# Patient Record
Sex: MALE | Race: BLACK OR AFRICAN AMERICAN | NOT HISPANIC OR LATINO | URBAN - METROPOLITAN AREA
[De-identification: names, ages, dates, MRNs, and addresses within clinical notes are randomized per-mention and may not be internally consistent; named-entity substitution may affect disease eponyms.]

---

## 2019-10-08 ENCOUNTER — HOSPITAL ENCOUNTER (EMERGENCY)
Facility: HOSPITAL | Age: 34
Discharge: HOME | End: 2019-10-08
Attending: EMERGENCY MEDICINE
Payer: COMMERCIAL

## 2019-10-08 VITALS
OXYGEN SATURATION: 100 % | DIASTOLIC BLOOD PRESSURE: 63 MMHG | SYSTOLIC BLOOD PRESSURE: 133 MMHG | HEART RATE: 58 BPM | TEMPERATURE: 98 F | RESPIRATION RATE: 16 BRPM

## 2019-10-08 DIAGNOSIS — S61.209A FLEXOR TENDON LACERATION OF FINGER WITH OPEN WOUND, INITIAL ENCOUNTER: Primary | ICD-10-CM

## 2019-10-08 DIAGNOSIS — S56.129A FLEXOR TENDON LACERATION OF FINGER WITH OPEN WOUND, INITIAL ENCOUNTER: Primary | ICD-10-CM

## 2019-10-08 PROCEDURE — 63600175 PHARM REV CODE 636 W HCPCS: Performed by: PHYSICIAN ASSISTANT

## 2019-10-08 PROCEDURE — 90715 TDAP VACCINE 7 YRS/> IM: CPT | Performed by: PHYSICIAN ASSISTANT

## 2019-10-08 PROCEDURE — 25000003 PHARM REV CODE 250: Performed by: STUDENT IN AN ORGANIZED HEALTH CARE EDUCATION/TRAINING PROGRAM

## 2019-10-08 PROCEDURE — 99284 EMERGENCY DEPT VISIT MOD MDM: CPT | Mod: 25

## 2019-10-08 PROCEDURE — 99284 PR EMERGENCY DEPT VISIT,LEVEL IV: ICD-10-PCS | Mod: ,,, | Performed by: PHYSICIAN ASSISTANT

## 2019-10-08 PROCEDURE — 90471 IMMUNIZATION ADMIN: CPT | Performed by: PHYSICIAN ASSISTANT

## 2019-10-08 PROCEDURE — 25000003 PHARM REV CODE 250: Performed by: PHYSICIAN ASSISTANT

## 2019-10-08 PROCEDURE — 99284 EMERGENCY DEPT VISIT MOD MDM: CPT | Mod: ,,, | Performed by: PHYSICIAN ASSISTANT

## 2019-10-08 RX ORDER — LIDOCAINE HYDROCHLORIDE 10 MG/ML
10 INJECTION, SOLUTION EPIDURAL; INFILTRATION; INTRACAUDAL; PERINEURAL
Status: COMPLETED | OUTPATIENT
Start: 2019-10-08 | End: 2019-10-08

## 2019-10-08 RX ORDER — HYDROCODONE BITARTRATE AND ACETAMINOPHEN 5; 325 MG/1; MG/1
1 TABLET ORAL
Status: COMPLETED | OUTPATIENT
Start: 2019-10-08 | End: 2019-10-08

## 2019-10-08 RX ORDER — SULFAMETHOXAZOLE AND TRIMETHOPRIM 800; 160 MG/1; MG/1
1 TABLET ORAL
Status: COMPLETED | OUTPATIENT
Start: 2019-10-08 | End: 2019-10-08

## 2019-10-08 RX ORDER — SULFAMETHOXAZOLE AND TRIMETHOPRIM 800; 160 MG/1; MG/1
1 TABLET ORAL 2 TIMES DAILY
Qty: 20 TABLET | Refills: 0 | Status: SHIPPED | OUTPATIENT
Start: 2019-10-08 | End: 2019-10-18

## 2019-10-08 RX ADMIN — LIDOCAINE HYDROCHLORIDE 100 MG: 10 INJECTION, SOLUTION EPIDURAL; INFILTRATION; INTRACAUDAL; PERINEURAL at 01:10

## 2019-10-08 RX ADMIN — CLOSTRIDIUM TETANI TOXOID ANTIGEN (FORMALDEHYDE INACTIVATED), CORYNEBACTERIUM DIPHTHERIAE TOXOID ANTIGEN (FORMALDEHYDE INACTIVATED), BORDETELLA PERTUSSIS TOXOID ANTIGEN (GLUTARALDEHYDE INACTIVATED), BORDETELLA PERTUSSIS FILAMENTOUS HEMAGGLUTININ ANTIGEN (FORMALDEHYDE INACTIVATED), BORDETELLA PERTUSSIS PERTACTIN ANTIGEN, AND BORDETELLA PERTUSSIS FIMBRIAE 2/3 ANTIGEN 0.5 ML: 5; 2; 2.5; 5; 3; 5 INJECTION, SUSPENSION INTRAMUSCULAR at 12:10

## 2019-10-08 RX ADMIN — HYDROCODONE BITARTRATE AND ACETAMINOPHEN 1 TABLET: 5; 325 TABLET ORAL at 12:10

## 2019-10-08 RX ADMIN — SULFAMETHOXAZOLE AND TRIMETHOPRIM 1 TABLET: 800; 160 TABLET ORAL at 03:10

## 2019-10-08 NOTE — ED PROVIDER NOTES
Encounter Date: 10/8/2019       History     Chief Complaint   Patient presents with    Finger Laceration     no bone exposed, bleeding controlled.     34-year-old male presents to the ED for evaluation of finger laceration.  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  He denies any numbness or tingling.  He is not able to fully flex the fingers.  Per chart, last tetanus was about 5 years ago.        Review of patient's allergies indicates:  No Known Allergies  History reviewed. No pertinent past medical history.  History reviewed. No pertinent surgical history.  History reviewed. No pertinent family history.  Social History     Tobacco Use    Smoking status: Never Smoker    Smokeless tobacco: Never Used   Substance Use Topics    Alcohol use: Not Currently     Frequency: Never    Drug use: Not Currently     Review of Systems   Constitutional: Negative for fever.   HENT: Negative for sore throat.    Respiratory: Negative for shortness of breath.    Cardiovascular: Negative for chest pain.   Gastrointestinal: Negative for nausea.   Genitourinary: Negative for dysuria.   Musculoskeletal: Negative for back pain.   Skin: Positive for wound. Negative for rash.   Neurological: Negative for weakness and numbness.   Hematological: Does not bruise/bleed easily.       Physical Exam     Initial Vitals [10/08/19 1215]   BP Pulse Resp Temp SpO2   (!) 140/86 70 18 98.2 °F (36.8 °C) 100 %      MAP       --         Physical Exam    Nursing note and vitals reviewed.  Constitutional: He appears well-developed and well-nourished.  Non-toxic appearance. He does not appear ill. No distress.   HENT:   Head: Normocephalic and atraumatic.   Neck: Normal range of motion. Neck supple.   Cardiovascular: Normal rate and regular rhythm. Exam reveals no gallop, no distant heart sounds and no friction rub.    No murmur  heard.  Pulmonary/Chest: Effort normal and breath sounds normal. No accessory muscle usage. No tachypnea. No respiratory distress. He has no decreased breath sounds. He has no wheezes. He has no rhonchi. He has no rales.   Abdominal: He exhibits no distension.   Musculoskeletal:   Lacerations to the 2nd and 3rd digits on the palmar aspect of the right hand.  Patient unable to flex at the DIP and PIP of the 2nd digit.  Limited flexion at the DIP and PIP of 3rd digit.  Normal sensation to light touch.  Brisk capillary refill.  Radial pulse intact. No significant swelling to the digits or hand.    Neurological: He is alert.   Skin: No rash noted.         ED Course   Procedures  Labs Reviewed - No data to display       Imaging Results          X-Ray Hand 3 view Right (Final result)  Result time 10/08/19 14:16:13    Final result by Yannick Bennett MD (10/08/19 14:16:13)                 Impression:      Soft tissue wound likely lacerations of the volar aspect of the right 2nd and 3rd digits.  No fracture or foreign body seen.      Electronically signed by: Yannick Bennett  Date:    10/08/2019  Time:    14:16             Narrative:    EXAMINATION:  XR HAND COMPLETE 3 VIEW RIGHT    CLINICAL HISTORY:  hand injury with lacerations;    TECHNIQUE:  PA, lateral, and oblique views of the right hand were performed.    COMPARISON:  None    FINDINGS:  Frontal, oblique and lateral views presented.  There is soft tissue irregularity along the volar aspect of the right 2nd digit consistent with laceration.  No foreign body seen.  No fracture or erosion or bone defect.  The mineralization and joint space and alignment are normal.  There is also soft tissue irregularity along the volar aspect of the 3rd digit adjacent to the middle phalanx.  No fracture or erosion or periosteal reaction or bone defect.                                 Medical Decision Making:   History:   Old Medical Records: I decided to obtain old medical  records.  Differential Diagnosis:   My differential diagnosis includes but is not limited to:  Laceration, open fracture, tendon laceration, nerve injury, vascular injury  Clinical Tests:   Radiological Study: Ordered  ED Management:  34-year-old male presents for evaluation lacerations to the right hand.  Patient's tetanus vaccine was updated in the ED as it was nearly 5 years old.  Based on patient's exam, I have concern for flexor tendon laceration on digits 2 and 3 of the right hand.  Orthopedics was consulted.  They evaluated the patient in the ED.  A splint was placed in the ED.  They have arranged close follow-up and surgical repair for this week.  Patient will be discharged with a 10 day course of Bactrim.  He was given the 1st dose in the ED.  Will prescribe a small amount of pain medication.  ED return precautions given.  Stable for discharge.  I have reviewed the patient's records and discussed this case with my supervising physician.    Other:   I have discussed this case with another health care provider.       <> Summary of the Discussion: Orthopedic surgery                      Clinical Impression:       ICD-10-CM ICD-9-CM   1. Flexor tendon laceration of finger with open wound, initial encounter S56.129A 883.2    S61.209A          Disposition:   Disposition: Discharged  Condition: Stable                        Bobbi Wasserman PA-C  10/08/19 2903

## 2019-10-08 NOTE — SUBJECTIVE & OBJECTIVE
History reviewed. No pertinent past medical history.    History reviewed. No pertinent surgical history.    Review of patient's allergies indicates:  No Known Allergies    No current facility-administered medications for this encounter.      Current Outpatient Medications   Medication Sig    sulfamethoxazole-trimethoprim 800-160mg (BACTRIM DS) 800-160 mg Tab Take 1 tablet by mouth 2 (two) times daily. for 10 days     Family History     None        Tobacco Use    Smoking status: Never Smoker    Smokeless tobacco: Never Used   Substance and Sexual Activity    Alcohol use: Not Currently     Frequency: Never    Drug use: Not Currently    Sexual activity: Not on file     Review of Systems   Cardiovascular: Positive for syncope.    per ED  Objective:     Vital Signs (Most Recent):  Temp: 98.2 °F (36.8 °C) (10/08/19 1215)  Pulse: (!) 58 (10/08/19 1506)  Resp: 16 (10/08/19 1506)  BP: 133/63 (10/08/19 1506)  SpO2: 100 % (10/08/19 1506) Vital Signs (24h Range):  Temp:  [98.2 °F (36.8 °C)] 98.2 °F (36.8 °C)  Pulse:  [58-70] 58  Resp:  [16-18] 16  SpO2:  [100 %] 100 %  BP: (133-140)/(63-86) 133/63           There is no height or weight on file to calculate BMI.    No intake or output data in the 24 hours ending 10/08/19 1612    Ortho/SPM Exam  PE:  Gen:  No acute distress  CV:  Peripherally well-perfused.    Lungs:  Normal respiratory effort.  Head/Neck:  Normocephalic.  Atraumatic.     RUE:  4 cm tranverse laceration on volar aspect of R index finger distal to MCP  7 cm oblique laceration on volar aspect of R middle finger from PIP to DIP  No active flexion of index finger at PIP or DIP  No active flexion of middle finger at DIP  Diminished sensation to light touch on index and middle fingers  Bleeding from digital artery of middle finger  No open wounds/abrasions/laceration  No bony TTP  FROM shoulder, elbow and wrist  SILT M/U/R  Motor intact AIN/PIN/M/U/R   Cap refill < 2s  2+ RP      Significant Labs: All pertinent  labs within the past 24 hours have been reviewed.    Significant Imaging: I have reviewed all pertinent imaging results/findings.     Procedure Note: Laceration Repair  After time out was performed and patient ID, side, and site were verified, the right Index finger and Right middle finger was sterilly prepped in the standard fashion.  10 mL's of 1% lidocaine were injected around the site with a 25-gauge needle. After adequate analgesia was obtained, the wound was examined. Examination showed complete laceration of flexor tendons on R index finger. The laceration was then thoroughly irrigated with 2L of normal saline and betadine. At this point, the wound was primarily closed using 4 - 0 Ethilon. The wound was dressed with xeroform, 4x4 and patient was placed in radial gutter splint. The patient tolerated the procedure well with no complications.  Blood loss was minimal.

## 2019-10-08 NOTE — HPI
Delmer Castellano is a 34 y.o. male who presents to ED with laceration of volar aspect of 2nd and 3rd right digits after having his hand get caught between a pole and traffic sign while at work. The patient noticed immediate pain, bleeding, and inability to flex his 2nd digit. The patient states the bleeding stopped after holding pressure for a few minutes, but he also began to notice numbness distal to MCP of 2nd and 3rd fingers while in the ED which was not present prior. The patient denies tingling to his fingers. 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 No prior injuries to his RUE. Patient had last tetanus booster around 5 years ago.

## 2019-10-08 NOTE — DISCHARGE INSTRUCTIONS
You can take Tylenol every 4-6 hours in addition to ibuprofen 400 or 600 mg every 4-6 hours as needed for pain. Keep the hand elevated at or above the level of the heart when possible.   Take care antibiotic medications as prescribed.  You should receive a phone call from Orthopedics in the next day 4 year follow-up information.   Return to the ER immediately for any new or worsening symptoms such as:  Numbness to the fingers, change in the color of the skin of the fingers, worsening pain, fevers greater than 100.4°, or any concerning symptoms.

## 2019-10-08 NOTE — ASSESSMENT & PLAN NOTE
Delmer Castellano is a 34 y.o. male who presents to ED with laceration of Right index and middle fingers. The patient does have some diminished sensation to these digits, and likely complete lacerations to both FDS and FDP of his R index finger and FDP of his R middle finger. Discussed with patient that he will likely need surgery by hand surgeon for repair of these injuries. Recommend patient be discharged on Bactrim x10 days. We will arrange follow-up for this patient with our orthopaedic hand surgeon this week.

## 2019-10-08 NOTE — ED TRIAGE NOTES
Patient arrives via EMS, states his fingers were crushed between a pole on the truck fell over and the traffic sign crushed his fingers. Laceration to right hand second finger second joint and third finger second joint.

## 2019-10-08 NOTE — CONSULTS
[facility]  Orthopedics  Consult Note    Patient Name: Delmer Castellano  MRN: [id number]  Admission Date: 10/8/2019  Hospital Length of Stay: 0 days  Attending Provider: No att. providers found  Primary Care Provider: No primary care provider on file.    Patient information was obtained from patient and ER records.     Inpatient consult to Orthopedic Surgery  Consult performed by: Cody Valles MD  Consult ordered by: Bobbi Wasserman PA-C        Subjective:     Principal Problem:Flexor tendon laceration of finger with open wound    Chief Complaint:   Chief Complaint   Patient presents with    Finger Laceration     no bone exposed, bleeding controlled.        HPI: Delmer Castellano is a 34 y.o. male who presents to ED with laceration of volar aspect of 2nd and 3rd right digits after having his hand get caught between a pole and traffic sign while at work. The patient noticed immediate pain, bleeding, and inability to flex his 2nd digit. The patient states the bleeding stopped after holding pressure for a few minutes, but he also began to notice numbness distal to MCP of 2nd and 3rd fingers while in the ED which was not present prior. The patient denies tingling to his fingers. 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 No prior injuries to his RUE. Patient had last tetanus booster around 5 years ago.      History reviewed. No pertinent past medical history.    History reviewed. No pertinent surgical history.    Review of patient's allergies indicates:  No Known Allergies    No current facility-administered medications for this encounter.      Current Outpatient Medications   Medication Sig    sulfamethoxazole-trimethoprim 800-160mg (BACTRIM DS) 800-160 mg Tab Take 1 tablet by mouth 2 (two) times daily. for 10 days     Family History     None        Tobacco Use    Smoking status: Never Smoker    Smokeless tobacco: Never Used   Substance and Sexual  Activity    Alcohol use: Not Currently     Frequency: Never    Drug use: Not Currently    Sexual activity: Not on file     Review of Systems   Cardiovascular: Positive for syncope.    per ED  Objective:     Vital Signs (Most Recent):  Temp: 98.2 °F (36.8 °C) (10/08/19 1215)  Pulse: (!) 58 (10/08/19 1506)  Resp: 16 (10/08/19 1506)  BP: 133/63 (10/08/19 1506)  SpO2: 100 % (10/08/19 1506) Vital Signs (24h Range):  Temp:  [98.2 °F (36.8 °C)] 98.2 °F (36.8 °C)  Pulse:  [58-70] 58  Resp:  [16-18] 16  SpO2:  [100 %] 100 %  BP: (133-140)/(63-86) 133/63           There is no height or weight on file to calculate BMI.    No intake or output data in the 24 hours ending 10/08/19 1612    Ortho/SPM Exam  PE:  Gen:  No acute distress  CV:  Peripherally well-perfused.    Lungs:  Normal respiratory effort.  Head/Neck:  Normocephalic.  Atraumatic.      RUE:  4 cm tranverse laceration on volar aspect of R index finger distal to MCP  7 cm oblique laceration on volar aspect of R middle finger from PIP to DIP  No active flexion of index finger at PIP or DIP  No active flexion of middle finger at DIP  Diminished sensation to light touch on index and middle fingers  Bleeding from digital artery of middle finger  No open wounds/abrasions/laceration  No bony TTP  FROM shoulder, elbow and wrist  SILT M/U/R  Motor intact AIN/PIN/M/U/R   Cap refill < 2s  2+ RP      Significant Labs: All pertinent labs within the past 24 hours have been reviewed.    Significant Imaging: I have reviewed all pertinent imaging results/findings.     Procedure Note: Laceration Repair  After time out was performed and patient ID, side, and site were verified, the right Index finger and Right middle finger was sterilly prepped in the standard fashion.  10 mL's of 1% lidocaine were injected around the site with a 25-gauge needle. After adequate analgesia was obtained, the wound was examined. Examination showed complete laceration of flexor tendons on R index finger.  The laceration was then thoroughly irrigated with 2L of normal saline and betadine. At this point, the wound was primarily closed using 4 - 0 Ethilon. The wound was dressed with xeroform, 4x4 and patient was placed in radial gutter splint. The patient tolerated the procedure well with no complications.  Blood loss was minimal.      Assessment/Plan:     * Flexor tendon laceration of finger with open wound  Delmer Castellano is a 34 y.o. male who presents to ED with laceration of Right index and middle fingers. The patient does have some diminished sensation to these digits, and likely complete lacerations to both FDS and FDP of his R index finger and FDP of his R middle finger. Discussed with patient that he will likely need surgery by hand surgeon for repair of these injuries. Recommend patient be discharged on Bactrim x10 days. We will arrange follow-up for this patient with our orthopaedic hand surgeon this week.          Thank you for your consult. I will follow-up with patient. Please contact us if you have any additional questions.    Cody Valles MD  Orthopedics  [facility]

## 2019-10-08 NOTE — ED NOTES
Patient identifiers verified and correct for Mr Castellano  C/C: Crush injury to right hand, laceration   APPEARANCE: awake and alert in NAD.  SKIN: warm, dry eliptical laceration 4.5 x 1 cm right index finger, linear laceration 4 cm to right hand 3rd finger, min bleeding, no obvious open bones  MUSCULOSKELETAL: Patient moving all extremities spontaneously, no obvious swelling or deformities noted. Ambulates independently.  RESPIRATORY: Denies shortness of breath.Respirations unlabored.   CARDIAC: Denies CP, 2+ distal pulses; no peripheral edema  ABDOMEN: S/ND/NT, Denies nausea  : voids spontaneously, denies difficulty  Neurologic: AAO x 4; follows commands equal strength in all extremities; denies numbness/tingling. Denies dizziness Denies weakness, positive sensation, mvmt better to 3rd finger, difficulty bending finger, positive radial pulse

## 2019-10-10 ENCOUNTER — OFFICE VISIT (OUTPATIENT)
Dept: ORTHOPEDICS | Facility: CLINIC | Age: 34
End: 2019-10-10
Payer: COMMERCIAL

## 2019-10-10 ENCOUNTER — ANESTHESIA EVENT (OUTPATIENT)
Dept: SURGERY | Facility: HOSPITAL | Age: 34
End: 2019-10-10
Payer: COMMERCIAL

## 2019-10-10 VITALS
BODY MASS INDEX: 20.79 KG/M2 | HEIGHT: 71 IN | SYSTOLIC BLOOD PRESSURE: 108 MMHG | DIASTOLIC BLOOD PRESSURE: 62 MMHG | WEIGHT: 148.5 LBS | HEART RATE: 67 BPM

## 2019-10-10 DIAGNOSIS — S56.129A FLEXOR TENDON LACERATION OF FINGER WITH OPEN WOUND, INITIAL ENCOUNTER: Primary | ICD-10-CM

## 2019-10-10 DIAGNOSIS — S56.129A FLEXOR TENDON LACERATION OF FINGER WITH OPEN WOUND: ICD-10-CM

## 2019-10-10 DIAGNOSIS — S61.209A FLEXOR TENDON LACERATION OF FINGER WITH OPEN WOUND: ICD-10-CM

## 2019-10-10 DIAGNOSIS — S61.209A FLEXOR TENDON LACERATION OF FINGER WITH OPEN WOUND, INITIAL ENCOUNTER: Primary | ICD-10-CM

## 2019-10-10 PROCEDURE — 99204 OFFICE O/P NEW MOD 45 MIN: CPT | Mod: S$GLB,,, | Performed by: ORTHOPAEDIC SURGERY

## 2019-10-10 PROCEDURE — 99213 OFFICE O/P EST LOW 20 MIN: CPT | Mod: PBBFAC | Performed by: ORTHOPAEDIC SURGERY

## 2019-10-10 PROCEDURE — 99999 PR PBB SHADOW E&M-EST. PATIENT-LVL III: ICD-10-PCS | Mod: PBBFAC,,, | Performed by: ORTHOPAEDIC SURGERY

## 2019-10-10 PROCEDURE — 99999 PR PBB SHADOW E&M-EST. PATIENT-LVL III: CPT | Mod: PBBFAC,,, | Performed by: ORTHOPAEDIC SURGERY

## 2019-10-10 PROCEDURE — 99204 PR OFFICE/OUTPT VISIT, NEW, LEVL IV, 45-59 MIN: ICD-10-PCS | Mod: S$GLB,,, | Performed by: ORTHOPAEDIC SURGERY

## 2019-10-10 RX ORDER — MUPIROCIN 20 MG/G
OINTMENT TOPICAL
Status: CANCELLED | OUTPATIENT
Start: 2019-10-10

## 2019-10-10 RX ORDER — SODIUM CHLORIDE 9 MG/ML
INJECTION, SOLUTION INTRAVENOUS CONTINUOUS
Status: CANCELLED | OUTPATIENT
Start: 2019-10-10

## 2019-10-10 NOTE — H&P (VIEW-ONLY)
Attestation signed by Jluis Ugalde MD at 10/10/2019 3:24 PM   I have seen the patient, reviewed the Resident's history and physical. I have personally interviewed and examined the patient at bedside and agree with the findings.     Status post lacerations while working to the right index and long fingers.  Patient has decreased light touch sensation to the index and long fingers and decreased 2 point discrimination on Pato testing today.  FDS is intact by exam to the long finger and the patient seems to have minimal flexion of the DIP joint although this is minimal and the long finger warrants exploration.  FDS and FDP out to the index finger by examination.  Will plan floor exploration and repair of any indicated structures to the right index and long fingers including tendons and nerves and any other indicated procedure. Discussed with the patient potential need for nerve allografting.  We will proceed tomorrow with surgery.     The patient has not responded to adequate non operative treatment at this time and/or non operative treatment is not indicated. Thus, the risks, benefits and alternatives to surgery were discussed with the patient in detail.  Specific risks include but are not limited to bleeding, infection, vessel and/or nerve damage, pain, numbness, tingling, complex regional pain syndrome, compartment syndrome, failure to return to pre-injury and/or preoperative functional status, scar sensitivity, delayed healing, inability to return to work, pulley injury, tendon injury, bowstringing, partial and/or incomplete relief of symptoms, weakness, persistence of and/or worsening of symptoms, surgical failure, osteomyelitis, amputation, loss of function, stiffness, functional debility, dysfunction, decreased  strength, need for prolonged postoperative rehabilitation, need for further surgery, deep venous thrombosis, pulmonary embolism, arthritis and death.  The patient states an understanding and  wishes to proceed with surgery.   All questions were answered.  No guarantees were implied or stated.  Written informed consent was obtained.           MD Kaden Matthews - Orthopedics      Expand All Collapse All      []Hide copied text    []Rebecca for details  [facility]  History & Physical  Orthopedics     SUBJECTIVE:       Chief Complaint: laceration to right hand     Referring Provider: Self, Aaareferral      History of Present Illness:  Patient is a 34 y.o. right hand dominant male who presents today with complaints of laceration to right hand involving index and long fingers.  Patient reports that he was at work when a construction sign fell out of a truck to his right hand.  He sustained lacerations and presented the ER where he was seen irrigated closed put on antibiotics and given tetanus and sent to our clinic for evaluation. Patient has been in a splint since that time..      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     Onset of symptoms/DOI was 2 says ago.     Symptoms are aggravated by movement.     Symptoms are alleviated by rest.     Symptoms consist of pain, laceration and decreased ROM.     The patient rates their pain as a 6/10.     Attempted treatment(s) and/or interventions include immobilization, antibiotic therapy and splinting/casting.     The patient denies any fevers, chills, N/V, D/C and presents for evaluation.        No past medical history on file.  No past surgical history on file.  Review of patient's allergies indicates:  No Known Allergies  Social History          Social History Narrative    Not on file      No family history on file.        Current Outpatient Medications:     sulfamethoxazole-trimethoprim 800-160mg (BACTRIM DS) 800-160 mg Tab, Take 1 tablet by mouth 2 (two) times daily. for 10 days, Disp: 20 tablet, Rfl: 0        Review of Systems:  Constitutional: no fever or chills  Eyes: no visual changes  ENT: no nasal congestion or sore  "throat  Respiratory: no cough or shortness of breath  Cardiovascular: no chest pain  Gastrointestinal: no nausea or vomiting, tolerating diet  Musculoskeletal: pain and laceration     OBJECTIVE:       Vital Signs (Most Recent):  Vitals       Vitals:     10/10/19 1446   BP: 108/62   BP Location: Left arm   Patient Position: Sitting   BP Method: Medium (Automatic)   Pulse: 67   Weight: 67.4 kg (148 lb 7.7 oz)   Height: 5' 11" (1.803 m)         Body mass index is 20.71 kg/m².        Physical Exam:  Constitutional: The patient appears well-developed and well-nourished. No distress.   Head: Normocephalic and atraumatic.   Nose: Nose normal.   Eyes: Conjunctivae and EOM are normal.   Neck: No tracheal deviation present.   Cardiovascular: Normal rate and intact distal pulses.    Pulmonary/Chest: Effort normal. No respiratory distress.   Abdominal: There is no guarding.   Neurological: The patient is alert.   Psychiatric: The patient has a normal mood and affect.      Right Hand/Wrist Examination:     Observation/Inspection:  Swelling                       none                  Deformity                     none  Discoloration               none                  Scars                           laceration                      Atrophy                        none     HAND/WRIST EXAMINATION:  Finkelstein's Test                                Neg  WHAT Test                                         Neg  Snuff box tenderness                          Neg  Tran's Test                                     Neg  Hook of Hamate Tenderness              Neg  CMC grind                                           Neg  Circumduction test                              Neg     Neurovascular Exam:  Digits WWP, brisk CR < 3s throughout  NVI motor/LTS to M/R/U nerves, radial pulse 2+  Tinel's Test - Carpal Tunnel                Neg  Tinel's Test - Cubital Tunnel               Neg  Phalen's Test                                      Neg  Median Nerve " Compression Test       Neg     Lacerations present to the volar aspect of the right long and index fingers proximally 2 cm long.  Sensation intact but slightly decreased to both fingers.  Small amount of movement of the DIP of the long finger full motion of the PIP in isolation of the long finger.  Flicker motion of the DIP of the index finger with no motion of the PIP of the index finger.     RRR  CTAB  Abd S/NT/ND +BS     Diagnostic Results:     Xray -  no osseous abnormalities found, no foreign body.        ASSESSMENT/PLAN:       34 y.o. yo male with zone 2 lacerations over the right index and long fingers.  Concern for complete FDS incomplete versus incomplete FDP laceration to the right index finger.  Concern for incomplete versus complete laceration of the FDP of the long finger.  Plan:  We will plan for surgical exploration tomorrow 10/11/2019.     Risks/benefits/alternatives to surgery discussed with patient  Consent obtained                 Jluis Ugalde M.D.     · Please be aware that this note has been generated with the assistance of [employer] voice-to-text.  Please excuse any spelling or grammatical errors.

## 2019-10-10 NOTE — H&P
Attestation signed by Jluis Ugalde MD at 10/10/2019 3:24 PM   I have seen the patient, reviewed the Resident's history and physical. I have personally interviewed and examined the patient at bedside and agree with the findings.     Status post lacerations while working to the right index and long fingers.  Patient has decreased light touch sensation to the index and long fingers and decreased 2 point discrimination on Pato testing today.  FDS is intact by exam to the long finger and the patient seems to have minimal flexion of the DIP joint although this is minimal and the long finger warrants exploration.  FDS and FDP out to the index finger by examination.  Will plan floor exploration and repair of any indicated structures to the right index and long fingers including tendons and nerves and any other indicated procedure. Discussed with the patient potential need for nerve allografting.  We will proceed tomorrow with surgery.     The patient has not responded to adequate non operative treatment at this time and/or non operative treatment is not indicated. Thus, the risks, benefits and alternatives to surgery were discussed with the patient in detail.  Specific risks include but are not limited to bleeding, infection, vessel and/or nerve damage, pain, numbness, tingling, complex regional pain syndrome, compartment syndrome, failure to return to pre-injury and/or preoperative functional status, scar sensitivity, delayed healing, inability to return to work, pulley injury, tendon injury, bowstringing, partial and/or incomplete relief of symptoms, weakness, persistence of and/or worsening of symptoms, surgical failure, osteomyelitis, amputation, loss of function, stiffness, functional debility, dysfunction, decreased  strength, need for prolonged postoperative rehabilitation, need for further surgery, deep venous thrombosis, pulmonary embolism, arthritis and death.  The patient states an understanding and  wishes to proceed with surgery.   All questions were answered.  No guarantees were implied or stated.  Written informed consent was obtained.           MD Kaden Matthews - Orthopedics      Expand All Collapse All      []Hide copied text    []Rebecca for details  [facility]  History & Physical  Orthopedics     SUBJECTIVE:       Chief Complaint: laceration to right hand     Referring Provider: Self, Aaareferral      History of Present Illness:  Patient is a 34 y.o. right hand dominant male who presents today with complaints of laceration to right hand involving index and long fingers.  Patient reports that he was at work when a construction sign fell out of a truck to his right hand.  He sustained lacerations and presented the ER where he was seen irrigated closed put on antibiotics and given tetanus and sent to our clinic for evaluation. Patient has been in a splint since that time..      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     Onset of symptoms/DOI was 2 says ago.     Symptoms are aggravated by movement.     Symptoms are alleviated by rest.     Symptoms consist of pain, laceration and decreased ROM.     The patient rates their pain as a 6/10.     Attempted treatment(s) and/or interventions include immobilization, antibiotic therapy and splinting/casting.     The patient denies any fevers, chills, N/V, D/C and presents for evaluation.        No past medical history on file.  No past surgical history on file.  Review of patient's allergies indicates:  No Known Allergies  Social History          Social History Narrative    Not on file      No family history on file.        Current Outpatient Medications:     sulfamethoxazole-trimethoprim 800-160mg (BACTRIM DS) 800-160 mg Tab, Take 1 tablet by mouth 2 (two) times daily. for 10 days, Disp: 20 tablet, Rfl: 0        Review of Systems:  Constitutional: no fever or chills  Eyes: no visual changes  ENT: no nasal congestion or sore  "throat  Respiratory: no cough or shortness of breath  Cardiovascular: no chest pain  Gastrointestinal: no nausea or vomiting, tolerating diet  Musculoskeletal: pain and laceration     OBJECTIVE:       Vital Signs (Most Recent):  Vitals       Vitals:     10/10/19 1446   BP: 108/62   BP Location: Left arm   Patient Position: Sitting   BP Method: Medium (Automatic)   Pulse: 67   Weight: 67.4 kg (148 lb 7.7 oz)   Height: 5' 11" (1.803 m)         Body mass index is 20.71 kg/m².        Physical Exam:  Constitutional: The patient appears well-developed and well-nourished. No distress.   Head: Normocephalic and atraumatic.   Nose: Nose normal.   Eyes: Conjunctivae and EOM are normal.   Neck: No tracheal deviation present.   Cardiovascular: Normal rate and intact distal pulses.    Pulmonary/Chest: Effort normal. No respiratory distress.   Abdominal: There is no guarding.   Neurological: The patient is alert.   Psychiatric: The patient has a normal mood and affect.      Right Hand/Wrist Examination:     Observation/Inspection:  Swelling                       none                  Deformity                     none  Discoloration               none                  Scars                           laceration                      Atrophy                        none     HAND/WRIST EXAMINATION:  Finkelstein's Test                                Neg  WHAT Test                                         Neg  Snuff box tenderness                          Neg  Tran's Test                                     Neg  Hook of Hamate Tenderness              Neg  CMC grind                                           Neg  Circumduction test                              Neg     Neurovascular Exam:  Digits WWP, brisk CR < 3s throughout  NVI motor/LTS to M/R/U nerves, radial pulse 2+  Tinel's Test - Carpal Tunnel                Neg  Tinel's Test - Cubital Tunnel               Neg  Phalen's Test                                      Neg  Median Nerve " Compression Test       Neg     Lacerations present to the volar aspect of the right long and index fingers proximally 2 cm long.  Sensation intact but slightly decreased to both fingers.  Small amount of movement of the DIP of the long finger full motion of the PIP in isolation of the long finger.  Flicker motion of the DIP of the index finger with no motion of the PIP of the index finger.     RRR  CTAB  Abd S/NT/ND +BS     Diagnostic Results:     Xray -  no osseous abnormalities found, no foreign body.        ASSESSMENT/PLAN:       34 y.o. yo male with zone 2 lacerations over the right index and long fingers.  Concern for complete FDS incomplete versus incomplete FDP laceration to the right index finger.  Concern for incomplete versus complete laceration of the FDP of the long finger.  Plan:  We will plan for surgical exploration tomorrow 10/11/2019.     Risks/benefits/alternatives to surgery discussed with patient  Consent obtained                 Jluis Ugalde M.D.     · Please be aware that this note has been generated with the assistance of [employer] voice-to-text.  Please excuse any spelling or grammatical errors.

## 2019-10-10 NOTE — PROGRESS NOTES
[facility]  History & Physical  Orthopedics    SUBJECTIVE:      Chief Complaint: laceration to right hand    Referring Provider: Self, Aaareferral     History of Present Illness:  Patient is a 34 y.o. right hand dominant male who presents today with complaints of laceration to right hand involving index and long fingers.  Patient reports that he was at work when a construction sign fell out of a truck to his right hand.  He sustained lacerations and presented the ER where he was seen irrigated closed put on antibiotics and given tetanus and sent to our clinic for evaluation. Patient has been in a splint since that time..     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    Onset of symptoms/DOI was 2 says ago.    Symptoms are aggravated by movement.    Symptoms are alleviated by rest.    Symptoms consist of pain, laceration and decreased ROM.    The patient rates their pain as a 6/10.    Attempted treatment(s) and/or interventions include immobilization, antibiotic therapy and splinting/casting.     The patient denies any fevers, chills, N/V, D/C and presents for evaluation.       No past medical history on file.  No past surgical history on file.  Review of patient's allergies indicates:  No Known Allergies  Social History     Social History Narrative    Not on file     No family history on file.      Current Outpatient Medications:     sulfamethoxazole-trimethoprim 800-160mg (BACTRIM DS) 800-160 mg Tab, Take 1 tablet by mouth 2 (two) times daily. for 10 days, Disp: 20 tablet, Rfl: 0      Review of Systems:  Constitutional: no fever or chills  Eyes: no visual changes  ENT: no nasal congestion or sore throat  Respiratory: no cough or shortness of breath  Cardiovascular: no chest pain  Gastrointestinal: no nausea or vomiting, tolerating diet  Musculoskeletal: pain and laceration    OBJECTIVE:      Vital Signs (Most Recent):  Vitals:    10/10/19 1446   BP: 108/62   BP Location: Left arm   Patient  "Position: Sitting   BP Method: Medium (Automatic)   Pulse: 67   Weight: 67.4 kg (148 lb 7.7 oz)   Height: 5' 11" (1.803 m)     Body mass index is 20.71 kg/m².      Physical Exam:  Constitutional: The patient appears well-developed and well-nourished. No distress.   Head: Normocephalic and atraumatic.   Nose: Nose normal.   Eyes: Conjunctivae and EOM are normal.   Neck: No tracheal deviation present.   Cardiovascular: Normal rate and intact distal pulses.    Pulmonary/Chest: Effort normal. No respiratory distress.   Abdominal: There is no guarding.   Neurological: The patient is alert.   Psychiatric: The patient has a normal mood and affect.     Right Hand/Wrist Examination:    Observation/Inspection:  Swelling  none    Deformity  none  Discoloration  none     Scars   laceration    Atrophy  none    HAND/WRIST EXAMINATION:  Finkelstein's Test   Neg  WHAT Test    Neg  Snuff box tenderness   Neg  Tran's Test    Neg  Hook of Hamate Tenderness  Neg  CMC grind    Neg  Circumduction test   Neg    Neurovascular Exam:  Digits WWP, brisk CR < 3s throughout  NVI motor/LTS to M/R/U nerves, radial pulse 2+  Tinel's Test - Carpal Tunnel  Neg  Tinel's Test - Cubital Tunnel  Neg  Phalen's Test    Neg  Median Nerve Compression Test Neg    Lacerations present to the volar aspect of the right long and index fingers proximally 2 cm long.  Sensation intact but slightly decreased to both fingers.  Small amount of movement of the DIP of the long finger full motion of the PIP in isolation of the long finger.  Flicker motion of the DIP of the index finger with no motion of the PIP of the index finger.    RRR  CTAB  Abd S/NT/ND +BS    Diagnostic Results:     Xray -  no osseous abnormalities found, no foreign body.      ASSESSMENT/PLAN:      34 y.o. yo male with zone 2 lacerations over the right index and long fingers.  Concern for complete FDS incomplete versus incomplete FDP laceration to the right index finger.  Concern for incomplete " versus complete laceration of the FDP of the long finger.  Plan:  We will plan for surgical exploration tomorrow 10/11/2019.    Risks/benefits/alternatives to surgery discussed with patient  Consent obtained              Jluis Ugalde M.D.     Please be aware that this note has been generated with the assistance of [employer] voice-to-text.  Please excuse any spelling or grammatical errors.

## 2019-10-11 ENCOUNTER — HOSPITAL ENCOUNTER (OUTPATIENT)
Facility: HOSPITAL | Age: 34
Discharge: HOME | End: 2019-10-11
Attending: ORTHOPAEDIC SURGERY | Admitting: ORTHOPAEDIC SURGERY
Payer: COMMERCIAL

## 2019-10-11 ENCOUNTER — ANESTHESIA (OUTPATIENT)
Dept: SURGERY | Facility: HOSPITAL | Age: 34
End: 2019-10-11
Payer: COMMERCIAL

## 2019-10-11 DIAGNOSIS — S56.129A FLEXOR TENDON LACERATION OF FINGER WITH OPEN WOUND: Primary | ICD-10-CM

## 2019-10-11 DIAGNOSIS — S61.209A FLEXOR TENDON LACERATION OF FINGER WITH OPEN WOUND: Primary | ICD-10-CM

## 2019-10-11 DIAGNOSIS — S56.129A FLEXOR TENDON LACERATION OF FINGER WITH OPEN WOUND, INITIAL ENCOUNTER: ICD-10-CM

## 2019-10-11 DIAGNOSIS — S61.209A FLEXOR TENDON LACERATION OF FINGER WITH OPEN WOUND, INITIAL ENCOUNTER: ICD-10-CM

## 2019-10-11 PROCEDURE — 27800903 OPTIME MED/SURG SUP & DEVICES OTHER IMPLANTS: Performed by: ORTHOPAEDIC SURGERY

## 2019-10-11 PROCEDURE — 71000015 HC POSTOP RECOV 1ST HR: Performed by: ORTHOPAEDIC SURGERY

## 2019-10-11 PROCEDURE — 01810 ANES PX NRV MUSC F/ARM WRST: CPT | Performed by: ORTHOPAEDIC SURGERY

## 2019-10-11 PROCEDURE — 25000003 PHARM REV CODE 250: Performed by: ANESTHESIOLOGY

## 2019-10-11 PROCEDURE — 64831 REPAIR OF DIGIT NERVE: CPT | Mod: 51,F7,, | Performed by: ORTHOPAEDIC SURGERY

## 2019-10-11 PROCEDURE — 36000706: Performed by: ORTHOPAEDIC SURGERY

## 2019-10-11 PROCEDURE — D9220A PRA ANESTHESIA: ICD-10-PCS | Mod: ,,, | Performed by: ANESTHESIOLOGY

## 2019-10-11 PROCEDURE — 64832 REPAIR NERVE ADD-ON: CPT | Mod: F6,,, | Performed by: ORTHOPAEDIC SURGERY

## 2019-10-11 PROCEDURE — 64831 PR REPAIR OF DIGIT NERVE: ICD-10-PCS | Mod: 51,F7,, | Performed by: ORTHOPAEDIC SURGERY

## 2019-10-11 PROCEDURE — 64832 PR REPAIR EACH ADDNL DIGIT NERVE: ICD-10-PCS | Mod: F6,,, | Performed by: ORTHOPAEDIC SURGERY

## 2019-10-11 PROCEDURE — 26356 REPAIR FINGER/HAND TENDON: CPT | Mod: F6,,, | Performed by: ORTHOPAEDIC SURGERY

## 2019-10-11 PROCEDURE — D9220A PRA ANESTHESIA: Mod: ,,, | Performed by: ANESTHESIOLOGY

## 2019-10-11 PROCEDURE — S0020 INJECTION, BUPIVICAINE HYDRO: HCPCS | Performed by: ANESTHESIOLOGY

## 2019-10-11 PROCEDURE — 76942 ECHO GUIDE FOR BIOPSY: CPT | Mod: 26,,, | Performed by: ANESTHESIOLOGY

## 2019-10-11 PROCEDURE — 37000009 HC ANESTHESIA EA ADD 15 MINS: Performed by: ORTHOPAEDIC SURGERY

## 2019-10-11 PROCEDURE — 36000707: Performed by: ORTHOPAEDIC SURGERY

## 2019-10-11 PROCEDURE — 63600175 PHARM REV CODE 636 W HCPCS: Performed by: ORTHOPAEDIC SURGERY

## 2019-10-11 PROCEDURE — 64415 PR NERVE BLOCK INJ, ANES/STEROID, BRACHIAL PLEXUS, INCL IMAG GUIDANCE: ICD-10-PCS | Mod: 59,RT,, | Performed by: ANESTHESIOLOGY

## 2019-10-11 PROCEDURE — 26370 REPAIR FINGER/HAND TENDON: CPT | Mod: 51,F7,, | Performed by: ORTHOPAEDIC SURGERY

## 2019-10-11 PROCEDURE — 76942 PR U/S GUIDANCE FOR NEEDLE GUIDANCE: ICD-10-PCS | Mod: 26,,, | Performed by: ANESTHESIOLOGY

## 2019-10-11 PROCEDURE — 63600175 PHARM REV CODE 636 W HCPCS: Performed by: ANESTHESIOLOGY

## 2019-10-11 PROCEDURE — 94760 N-INVAS EAR/PLS OXIMETRY 1: CPT | Mod: 59

## 2019-10-11 PROCEDURE — 64415 NJX AA&/STRD BRCH PLXS IMG: CPT | Mod: 59 | Performed by: ANESTHESIOLOGY

## 2019-10-11 PROCEDURE — 26356 PR REPAIR FLEX TENDON,ZONE 2,HAND: ICD-10-PCS | Mod: F6,,, | Performed by: ORTHOPAEDIC SURGERY

## 2019-10-11 PROCEDURE — 63600175 PHARM REV CODE 636 W HCPCS: Performed by: NURSE ANESTHETIST, CERTIFIED REGISTERED

## 2019-10-11 PROCEDURE — 25000003 PHARM REV CODE 250: Performed by: ORTHOPAEDIC SURGERY

## 2019-10-11 PROCEDURE — 26370 PR REPAIR PROFUNDUS TENDON,PRIMARY: ICD-10-PCS | Mod: 51,F7,, | Performed by: ORTHOPAEDIC SURGERY

## 2019-10-11 PROCEDURE — 71000033 HC RECOVERY, INTIAL HOUR: Performed by: ORTHOPAEDIC SURGERY

## 2019-10-11 PROCEDURE — 27200750 HC INSULATED NEEDLE/ STIMUPLEX: Performed by: ANESTHESIOLOGY

## 2019-10-11 PROCEDURE — 76942 ECHO GUIDE FOR BIOPSY: CPT | Performed by: ANESTHESIOLOGY

## 2019-10-11 PROCEDURE — 27201423 OPTIME MED/SURG SUP & DEVICES STERILE SUPPLY: Performed by: ORTHOPAEDIC SURGERY

## 2019-10-11 PROCEDURE — 37000008 HC ANESTHESIA 1ST 15 MINUTES: Performed by: ORTHOPAEDIC SURGERY

## 2019-10-11 PROCEDURE — 25000003 PHARM REV CODE 250: Performed by: NURSE ANESTHETIST, CERTIFIED REGISTERED

## 2019-10-11 PROCEDURE — 64415 NJX AA&/STRD BRCH PLXS IMG: CPT | Mod: 59,RT,, | Performed by: ANESTHESIOLOGY

## 2019-10-11 DEVICE — IMPLANTABLE DEVICE: Type: IMPLANTABLE DEVICE | Site: HAND | Status: FUNCTIONAL

## 2019-10-11 RX ORDER — DEXAMETHASONE SODIUM PHOSPHATE 4 MG/ML
INJECTION, SOLUTION INTRA-ARTICULAR; INTRALESIONAL; INTRAMUSCULAR; INTRAVENOUS; SOFT TISSUE
Status: DISCONTINUED | OUTPATIENT
Start: 2019-10-11 | End: 2019-10-11

## 2019-10-11 RX ORDER — ONDANSETRON 2 MG/ML
INJECTION INTRAMUSCULAR; INTRAVENOUS
Status: DISCONTINUED | OUTPATIENT
Start: 2019-10-11 | End: 2019-10-11

## 2019-10-11 RX ORDER — LIDOCAINE HYDROCHLORIDE 10 MG/ML
INJECTION, SOLUTION INTRAVENOUS
Status: DISCONTINUED | OUTPATIENT
Start: 2019-10-11 | End: 2019-10-11

## 2019-10-11 RX ORDER — ACETAMINOPHEN 500 MG
1000 TABLET ORAL ONCE
Status: COMPLETED | OUTPATIENT
Start: 2019-10-11 | End: 2019-10-11

## 2019-10-11 RX ORDER — ACETAMINOPHEN 500 MG
1000 TABLET ORAL ONCE
Status: DISPENSED | OUTPATIENT
Start: 2019-10-11

## 2019-10-11 RX ORDER — PROPOFOL 10 MG/ML
VIAL (ML) INTRAVENOUS
Status: DISCONTINUED | OUTPATIENT
Start: 2019-10-11 | End: 2019-10-11

## 2019-10-11 RX ORDER — CEFAZOLIN SODIUM 1 G/3ML
2 INJECTION, POWDER, FOR SOLUTION INTRAMUSCULAR; INTRAVENOUS
Status: DISCONTINUED | OUTPATIENT
Start: 2019-10-11 | End: 2019-10-11 | Stop reason: HOSPADM

## 2019-10-11 RX ORDER — FENTANYL CITRATE 50 UG/ML
25 INJECTION, SOLUTION INTRAMUSCULAR; INTRAVENOUS EVERY 5 MIN PRN
Status: DISCONTINUED | OUTPATIENT
Start: 2019-10-11 | End: 2019-10-11 | Stop reason: HOSPADM

## 2019-10-11 RX ORDER — BUPIVACAINE HYDROCHLORIDE 5 MG/ML
INJECTION, SOLUTION EPIDURAL; INTRACAUDAL
Status: COMPLETED | OUTPATIENT
Start: 2019-10-11 | End: 2019-10-11

## 2019-10-11 RX ORDER — MIDAZOLAM HYDROCHLORIDE 1 MG/ML
INJECTION, SOLUTION INTRAMUSCULAR; INTRAVENOUS
Status: DISCONTINUED | OUTPATIENT
Start: 2019-10-11 | End: 2019-10-11

## 2019-10-11 RX ORDER — OXYCODONE HYDROCHLORIDE 5 MG/1
5 TABLET ORAL
Status: DISCONTINUED | OUTPATIENT
Start: 2019-10-11 | End: 2019-10-11 | Stop reason: HOSPADM

## 2019-10-11 RX ORDER — SODIUM CHLORIDE 9 MG/ML
INJECTION, SOLUTION INTRAVENOUS CONTINUOUS
Status: DISCONTINUED | OUTPATIENT
Start: 2019-10-11 | End: 2019-10-11 | Stop reason: HOSPADM

## 2019-10-11 RX ORDER — OXYCODONE AND ACETAMINOPHEN 5; 325 MG/1; MG/1
1-2 TABLET ORAL EVERY 6 HOURS PRN
Qty: 28 TABLET | Refills: 0 | Status: SHIPPED | OUTPATIENT
Start: 2019-10-11

## 2019-10-11 RX ORDER — KETOROLAC TROMETHAMINE 30 MG/ML
INJECTION, SOLUTION INTRAMUSCULAR; INTRAVENOUS
Status: DISCONTINUED | OUTPATIENT
Start: 2019-10-11 | End: 2019-10-11

## 2019-10-11 RX ORDER — FENTANYL CITRATE 50 UG/ML
100 INJECTION, SOLUTION INTRAMUSCULAR; INTRAVENOUS CONTINUOUS PRN
Status: DISCONTINUED | OUTPATIENT
Start: 2019-10-11 | End: 2019-10-11 | Stop reason: HOSPADM

## 2019-10-11 RX ORDER — MUPIROCIN 20 MG/G
OINTMENT TOPICAL
Status: DISCONTINUED | OUTPATIENT
Start: 2019-10-11 | End: 2019-10-11 | Stop reason: HOSPADM

## 2019-10-11 RX ORDER — FENTANYL CITRATE 50 UG/ML
INJECTION, SOLUTION INTRAMUSCULAR; INTRAVENOUS
Status: DISCONTINUED | OUTPATIENT
Start: 2019-10-11 | End: 2019-10-11

## 2019-10-11 RX ORDER — CELECOXIB 200 MG/1
400 CAPSULE ORAL DAILY
Status: DISCONTINUED | OUTPATIENT
Start: 2019-10-11 | End: 2019-10-11 | Stop reason: HOSPADM

## 2019-10-11 RX ORDER — PROPOFOL 10 MG/ML
VIAL (ML) INTRAVENOUS CONTINUOUS PRN
Status: DISCONTINUED | OUTPATIENT
Start: 2019-10-11 | End: 2019-10-11

## 2019-10-11 RX ORDER — MIDAZOLAM HYDROCHLORIDE 1 MG/ML
0.5 INJECTION INTRAMUSCULAR; INTRAVENOUS
Status: DISCONTINUED | OUTPATIENT
Start: 2019-10-11 | End: 2019-10-11 | Stop reason: HOSPADM

## 2019-10-11 RX ORDER — KETAMINE HYDROCHLORIDE 10 MG/ML
INJECTION, SOLUTION INTRAMUSCULAR; INTRAVENOUS
Status: DISCONTINUED | OUTPATIENT
Start: 2019-10-11 | End: 2019-10-11

## 2019-10-11 RX ADMIN — LIDOCAINE HYDROCHLORIDE 50 MG: 10 INJECTION, SOLUTION INTRAVENOUS at 10:10

## 2019-10-11 RX ADMIN — KETAMINE HYDROCHLORIDE 10 MG: 10 INJECTION INTRAMUSCULAR; INTRAVENOUS at 11:10

## 2019-10-11 RX ADMIN — CELECOXIB 400 MG: 200 CAPSULE ORAL at 09:10

## 2019-10-11 RX ADMIN — PROPOFOL 100 MCG/KG/MIN: 10 INJECTION, EMULSION INTRAVENOUS at 10:10

## 2019-10-11 RX ADMIN — KETAMINE HYDROCHLORIDE 10 MG: 10 INJECTION INTRAMUSCULAR; INTRAVENOUS at 10:10

## 2019-10-11 RX ADMIN — MIDAZOLAM HYDROCHLORIDE 2 MG: 1 INJECTION, SOLUTION INTRAMUSCULAR; INTRAVENOUS at 09:10

## 2019-10-11 RX ADMIN — KETOROLAC TROMETHAMINE 30 MG: 30 INJECTION, SOLUTION INTRAMUSCULAR at 01:10

## 2019-10-11 RX ADMIN — ONDANSETRON 4 MG: 2 INJECTION INTRAMUSCULAR; INTRAVENOUS at 01:10

## 2019-10-11 RX ADMIN — PROPOFOL 20 MG: 10 INJECTION, EMULSION INTRAVENOUS at 10:10

## 2019-10-11 RX ADMIN — CEFAZOLIN 2 G: 330 INJECTION, POWDER, FOR SOLUTION INTRAMUSCULAR; INTRAVENOUS at 10:10

## 2019-10-11 RX ADMIN — FENTANYL CITRATE 50 MCG: 50 INJECTION, SOLUTION INTRAMUSCULAR; INTRAVENOUS at 11:10

## 2019-10-11 RX ADMIN — BUPIVACAINE HYDROCHLORIDE 30 ML: 5 INJECTION, SOLUTION EPIDURAL; INTRACAUDAL; PERINEURAL at 09:10

## 2019-10-11 RX ADMIN — MUPIROCIN: 20 OINTMENT TOPICAL at 09:10

## 2019-10-11 RX ADMIN — FENTANYL CITRATE 50 MCG: 50 INJECTION, SOLUTION INTRAMUSCULAR; INTRAVENOUS at 12:10

## 2019-10-11 RX ADMIN — DEXAMETHASONE SODIUM PHOSPHATE 8 MG: 4 INJECTION, SOLUTION INTRAMUSCULAR; INTRAVENOUS at 10:10

## 2019-10-11 RX ADMIN — SODIUM CHLORIDE, SODIUM GLUCONATE, SODIUM ACETATE, POTASSIUM CHLORIDE, MAGNESIUM CHLORIDE, SODIUM PHOSPHATE, DIBASIC, AND POTASSIUM PHOSPHATE: .53; .5; .37; .037; .03; .012; .00082 INJECTION, SOLUTION INTRAVENOUS at 12:10

## 2019-10-11 RX ADMIN — OXYCODONE HYDROCHLORIDE 5 MG: 5 TABLET ORAL at 02:10

## 2019-10-11 RX ADMIN — MIDAZOLAM 1 MG: 1 INJECTION INTRAMUSCULAR; INTRAVENOUS at 10:10

## 2019-10-11 RX ADMIN — SODIUM CHLORIDE: 0.9 INJECTION, SOLUTION INTRAVENOUS at 09:10

## 2019-10-11 RX ADMIN — PROPOFOL: 10 INJECTION, EMULSION INTRAVENOUS at 01:10

## 2019-10-11 RX ADMIN — FENTANYL CITRATE 50 MCG: 50 INJECTION, SOLUTION INTRAMUSCULAR; INTRAVENOUS at 10:10

## 2019-10-11 RX ADMIN — KETAMINE HYDROCHLORIDE 10 MG: 10 INJECTION INTRAMUSCULAR; INTRAVENOUS at 12:10

## 2019-10-11 RX ADMIN — ACETAMINOPHEN 1000 MG: 500 TABLET ORAL at 09:10

## 2019-10-11 NOTE — TRANSFER OF CARE
"Anesthesia Transfer of Care Note    Patient: Delmer Castellano    Procedure(s) Performed: Procedure(s) (LRB):  REPAIR, TENDON, FLEXOR - right index and long finger exploration with repair of any damaged structures, need Arthrex 4-0 FiberLoops x entire box, axogen 1-2mm frozen nerve grafts, tarah hand, evicel fibrin glue (Right)    Patient location: PACU    Anesthesia Type: regional and general    Transport from OR: Transported from OR on room air with adequate spontaneous ventilation    Post pain: adequate analgesia    Post assessment: no apparent anesthetic complications and tolerated procedure well    Post vital signs: stable    Level of consciousness: sedated and responds to stimulation    Nausea/Vomiting: no nausea/vomiting    Complications: none    Transfer of care protocol was followed      Last vitals:   Visit Vitals  /65   Pulse (!) 55   Temp 36.5 °C (97.7 °F) (Axillary)   Resp 20   Ht 5' 11" (1.803 m)   Wt 66.2 kg (146 lb)   SpO2 99%   BMI 20.36 kg/m²     "

## 2019-10-11 NOTE — ANESTHESIA PROCEDURE NOTES
Right SC SS    Patient location during procedure: pre-op   Block not for primary anesthetic.  Reason for block: at surgeon's request and post-op pain management   Post-op Pain Location: right wrist pain   Start time: 10/11/2019 9:40 AM  Timeout: 10/11/2019 9:38 AM   End time: 10/11/2019 9:43 AM    Staffing  Authorizing Provider: Gayla Rosales MD  Performing Provider: Gayla Rosales MD    Preanesthetic Checklist  Completed: patient identified, site marked, surgical consent, pre-op evaluation, timeout performed, IV checked, risks and benefits discussed and monitors and equipment checked  Peripheral Block  Patient position: supine  Prep: ChloraPrep  Patient monitoring: heart rate, cardiac monitor, continuous pulse ox, continuous capnometry and frequent blood pressure checks  Block type: supraclavicular  Laterality: right  Injection technique: single shot  Needle  Needle type: Stimuplex   Needle gauge: 22 G  Needle length: 2 in  Needle localization: anatomical landmarks and ultrasound guidance  Needle insertion depth: 2 cm   -ultrasound image captured on disc.  Assessment  Injection assessment: negative aspiration, negative parasthesia and local visualized surrounding nerve  Paresthesia pain: none  Heart rate change: no  Slow fractionated injection: yes  Additional Notes  VSS.  [facility] RN monitoring vitals throughout procedure.  Patient tolerated procedure well.  bupiv with 1:400 k epi injected incrementally after neg aspiration

## 2019-10-11 NOTE — BRIEF OP NOTE
[facility]  Brief Operative Note     SUMMARY     Surgery Date: 10/11/2019     Surgeon(s) and Role:     * Jluis Ugalde MD - Primary    Assisting Surgeon: None    Pre-op Diagnosis:  Flexor tendon laceration of finger with open wound, initial encounter [S56.129A, S61.209A]    Post-op Diagnosis:  Post-Op Diagnosis Codes:     * Flexor tendon laceration of finger with open wound, initial encounter [S56.129A, S61.209A]    Procedure(s) (LRB):  REPAIR, TENDON, FLEXOR - right index and long finger exploration with repair of any damaged structures, need Arthrex 4-0 FiberLoops x entire box, axogen 1-2mm frozen nerve grafts, tarah hand, evicel fibrin glue (Right)    Anesthesia: Regional    Description of the findings of the procedure: see above    Findings/Key Components: see above    Estimated Blood Loss: * No values recorded between 10/11/2019 11:04 AM and 10/11/2019  2:02 PM *         Specimens:   Specimen (12h ago, onward)    None          Discharge Note    SUMMARY     Admit Date: 10/11/2019    Discharge Date and Time: No discharge date for patient encounter.    Hospital Course (synopsis of major diagnoses, care, treatment, and services provided during the course of the hospital stay): Hospital Course:  On 10/11/2019, the patient arrived to [facility] for proper pre-operative management.  Upon completion of pre-operative preparation, the patient was taken back to the operative theatre.  A flexor tendon repair was performed without complication and the patient was transported to the post anesthesia care unit in stable condition. The patient suffered minimal blood loss and electrolyte imbalances during the procedure, which were correct accordingly if neccessary. After appropriate recovery from the anaesthetic agents used during the surgery the patient was discharged home.       Final Diagnosis: Post-Op Diagnosis Codes:     * Flexor tendon laceration of finger with open wound, initial  encounter [L41.983B, V64.512X]    Disposition: Home or Self Care    Follow Up/Patient Instructions:     Medications:  Reconciled Home Medications:      Medication List      START taking these medications    oxyCODONE-acetaminophen 5-325 mg per tablet  Commonly known as:  PERCOCET  Take 1-2 tablets by mouth every 6 (six) hours as needed for Pain.        CONTINUE taking these medications    sulfamethoxazole-trimethoprim 800-160mg 800-160 mg Tab  Commonly known as:  BACTRIM DS  Take 1 tablet by mouth 2 (two) times daily. for 10 days          Discharge Procedure Orders   Call MD for:  temperature >100.4     Call MD for:  persistent nausea and vomiting or diarrhea     Call MD for:  severe uncontrolled pain     Call MD for:  redness, tenderness, or signs of infection (pain, swelling, redness, odor or green/yellow discharge around incision site)     Call MD for:  difficulty breathing or increased cough     Call MD for:  severe persistent headache     Call MD for:  worsening rash     Call MD for:  persistent dizziness, light-headedness, or visual disturbances     Call MD for:  increased confusion or weakness     Follow-up Information     Jluis Ugalde MD In 2 weeks.    Specialties:  Hand Surgery, Orthopedic Surgery  Contact information:  [address]  [address]  [facility] [id number] [phone]

## 2019-10-11 NOTE — INTERVAL H&P NOTE
The patient has been examined and the H&P has been reviewed:    I concur with the findings and no changes have occurred since H&P was written.    Anesthesia/Surgery risks, benefits and alternative options discussed and understood by patient/family.          Active Hospital Problems    Diagnosis  POA    Flexor tendon laceration of finger with open wound [R81.836A, Y46.800A]  Yes      Resolved Hospital Problems   No resolved problems to display.

## 2019-10-11 NOTE — OP NOTE
DATE OF PROCEDURE: 10/11/2019     SERVICE:  Orthopedic Surgery.     SURGEON:  Jluis Ugalde M.D.     FIRST ASSISTANT:   Stanislav Moreno MD resident     PREOPERATIVE DIAGNOSIS:    1) Sharp laceration right index finger zone II  2) Sharp laceration right long finger zone 1  3) flexor digitorum profundus tendon laceration right long finger zone 1  4) flexor digitorum profundus tendon laceration right index finger zone 2  5) flexor digitorum superficialis tendon laceration right index finger zone 2  6) Digital nerve injury/injuries right index finger  7) Digital nerve injury/injuries right long finger     POSTOPERATIVE DIAGNOSIS:    1)  Sharp laceration right index finger zone II  2) Sharp laceration right long finger zone 1  3)flexor digitorum profundus tendon laceration right long finger zone 1  4)flexor digitorum profundus tendon laceration right index finger zone 2  5)flexor digitorum superficialis tendon laceration right index finger zone 2  6) radial digital nerve injury right long finger  7) radial digital nerve injury right index finger  8) ulnar digital nerve injury right index finger     PROCEDURE(S) PERFORMED:    1) Irrigation and non excisional debridement right index finger and right long finger sharp lacerations  2) Flexor digitorum profundus tendon repair in the right long finger in zone 1  3) Flexor digitorum profundus tendon repair in the right index finger in zone 2  4) Flexor digitorum superficialis tendon repair in the right index finger in zone 2  5) Radial digital nerve repair right index finger with nerve allograft  6) Ulnar digital nerve repair right index finger via primary repair  7) Radial digital nerve repair right long finger with nerve allograft     TOURNIQUET TIME:   2 hr and 15 min minutes at 250mmHg.     ESTIMATED BLOOD LOSS:   5 mL.     IMPLANTS:   Axogen 1-2 mm diameter nerve allograft was utilized for digital nerve repairs x2     COMPLICATIONS:  None.     PACKS AND DRAINS:  None.      PATHOLOGIC SPECIMENS:  None.    ANESTHESIA:  Regional mac     IV FLUIDS: Crystalloid.     CONDITION:  Stable.    MICROBIOLOGY: None.    Indications for Procedure:     The patient is a 34-year-old male who while working sustained sharp lacerations to his right index and long fingers.  Preoperative evaluation and examination was consistent with suspected tendon and digital nerve injuries to the right index and long fingers  The patient has not responded to adequate non operative treatment at this time and/or non operative treatment is not indicated. Thus, the risks, benefits and alternatives to surgery were discussed with the patient in detail.  Specific risks include but are not limited to bleeding, infection, vessel and/or nerve damage, pain, numbness, tingling, complex regional pain syndrome, compartment syndrome, failure to return to pre-injury and/or preoperative functional status, scar sensitivity, delayed healing, inability to return to work, pulley injury, tendon injury, bowstringing, partial and/or incomplete relief of symptoms, weakness, persistence of and/or worsening of symptoms, surgical failure, osteomyelitis, amputation, loss of function, stiffness, functional debility, dysfunction, decreased  strength, need for prolonged postoperative rehabilitation, need for further surgery, deep venous thrombosis, pulmonary embolism, arthritis and death.  The patient states an understanding and wishes to proceed with surgery.   All questions were answered.  No guarantees were implied or stated.  Written informed consent was obtained.    Procedure in detail:    On the date of the operative intervention the patient was evaluated in the preoperative holding area. With his participation the right upper extremity was marked as the operative site.  He was then administered regional anesthesia and taken to the OR suite where the right upper extremity was placed on hand table. Nonsterile tourniquet was placed high on  the patient's right upper arm and the right upper extremity was prepped and draped in usual sterile fashion.  Time-out was taken and confirm I will present to cover the right patient's site procedure and then traced of preoperative antibiotics.  Already agreement so I proceeded.  Esmarch was utilized to exsanguinate the right upper extremity and tourniquet was insufflated to 250 mm of mercury where it remained for the duration of the procedure. The patient had sharp lacerations in zone 1 of the right long finger and zone 2 of the right index finger.  Previously placed sutures were removed and the lacerations were irrigated and non excisional debridement was performed. These lacerations were then extended in a Betzaida type fashion both proximal and distal to allow for thorough exploration. The index finger laceration was more proximal than the long finger.  Betzaida incisions were developed about the right index and long fingers incorporating the previous sharp lacerations into my incisions.  At each finger dissection was carried down through the skin and subcutaneous tissues maintaining full-thickness Betzaida flaps to both fingers.  Dissection was carried down deeply to the level of the flexor tendon sheath.  My attention was 1st turned towards the long finger.  Upon dissecting down to the flexor tendon sheath it was immediately evident that there is a zone 1 laceration of the flexor digitorum profundus which was complete.  There was some damage to the A4 pulley although the proximal extent of the A4 pulley remained intact. Both the proximal and distal stumps of the flexor digitorum profundus in zone 1 were then identified and prepared for tendon repair. I then dissected out the radial and ulnar neurovascular bundles of the right long finger and it was evident that there was complete transection of the radial neurovascular bundle including the digital nerve and the digital artery.  The ulnar neurovascular bundle of the  right long finger was intact. Having completed my exploration of the right long finger at turned my attention towards the index finger.  Dissecting down to the flexor tendon sheath, it was immediately evident that there is zone 2 lacerations with complete lacerations of the flexor digitorum superficialis and the flexor digitorum profundus.  The tendon proximal extents were not in the flexor tendon sheath and I extended my incision into the distal palm into zone 3 at which point I identified the index finger flexor tendons just proximal to the proximal extent of the A1 pulley.  I divided the A1 pulley to facilitate passage of the flexor tendons. I then atraumatically past the proximal stumps of both the FDS and FDP tendons underneath the A2 pulley which remained largely intact although it was mildly damaged from the sharp laceration.  These proximal tendon stumps were brought to meet their distal stumps and their anatomic orientation was restored.  I also prepared the distal tendon stumps for repair.  Once my tendons were provisionally reapproximated and prepared I turned my attention towards identification of the radial and ulnar neurovascular bundles of the right index finger.  It was immediately evident that there was complete transection of the radial neurovascular bundle with complete laceration of the radial digital nerve as well as the radial digital artery. Attention was turned towards the ulnar neurovascular bundle and the ulnar digital nerve was nearly completely transected with a small area of epi near am which remained intact on the dorsal/deep aspect.  The ulnar digital artery appeared intact. After completing my exploration of the right index finger and then turn my attention towards repair of the injured tendons. I 1st turned my attention towards the flexor digitorum superficialis of the index finger.  Both slips of the FDS tendon were repaired via modified Mendoza suture technique.  This was performed  with a 4 core strand repair and a 6 0 Prolene circumferential epitendinous repair.  Repair of this was excellent. I then repaired the FDP tendon of the right index finger again with 4 core sutures, this time via a modified Juanjo technique, and a running circumferential 6 0 Prolene epitendinous suture. Repair of the FDP to the index finger was excellent. I then turned my attention towards repair of the FDP to the right long finger.  Four core strands were utilized via the modified Juanjo technique followed by a running circumferential 6 0 Prolene epitendinous repair. Repair of this tendon was excellent. Having completed my tendinous repairs I then placed the index and long fingers through a passive range of motion which revealed no gapping of the tendon repair sites and excellent gliding underneath the A2 and A4 pulley structures.  It should be noted that given the trauma to the hand there is a small portion of the A2 pulley damaged in the right index finger which was not amenable to repair as well as significant damage to the A4 pulley of the right long finger with some of the pulley substance missing and also not amenable to repair.  However there was some structurally intact A2 and A4 pulleys throughout both fingers.  I then turned my attention towards repair of my digital nerves.  The fingers were extended and there was noted to be approximately a 9 mm gap to both the radial digital nerves of the index and long fingers.  I decided to utilize a nerve allograft to bridge this gap so as to allow a tension-free repair.  The ulnar digital nerve of the right index finger was able to be primarily repaired without any tension and did not necessitate an allograft.  The radial digital nerve of the index finger was 1st repaired.  This was repaired with 8 0 nylon suture VA interrupted at the neural suture. This was done at both the proximal and distal anastomosis with the nerve graft and then augmented with Evicel fibrin glue.   I then repaired the ulnar digital nerve of the right index finger with 8 0 nylon epineural simple interrupted sutures augmented with Evicel fibrin glue.  I then repaired the radial digital nerve of the right long finger utilizing and nerve allograft and 8 0 nylon simple interrupted epineural suture technique. Evicel fibrin glue was again utilized to augment this repair. Nerve repairs were excellent. Having completed my tendon and nerve repairs I turned my attention towards completion of the procedure. The tourniquet was deflated at which point brisk capillary refill in soon throughout the entire right upper extremity and specifically to both the index and long fingers.  There is no significant bleeding and hemostasis was achieved with bipolar electrocautery. The wounds were then irrigated copiously with sterile saline. For 0 nylon suture was utilized to close the skin with a combination of simple interrupted and horizontal mattress fashion. Sterile dressing was then applied consisting of Xeroform 4 x 4 gauze and a radial gutter splint to the right upper extremity.  After the splint was placed the fingertips was that were again examined and there was brisk capillary refill less than 3 sec throughout to the right hand and specifically to the right index and long fingers.  The patient was then awakened from anesthesia and returned to the post anesthesia care unit in stable condition.  There were no complications and as attending surgeon I was present and performed the critical portions of the procedure.    Postoperative plan for the patient:  The patient will be discharged home in stable condition. I will work on early initiation of occupational therapy to begin the rehabilitation process.  The patient will follow up with me in 2 weeks for suture removal and re-evaluation of the postoperative plan.     Please be aware that this note has been generated with the assistance of [employer] voice-to-text.  Please excuse any  spelling or grammatical errors.

## 2019-10-11 NOTE — PLAN OF CARE
Pt ready for discharge. Pt provided with crackers and water. Pt denies any pain or nausea at this time. Will continue to prepare pt for discharge. Discharge instructions reviewed with pt and his mother. Both verbalized understanding

## 2019-10-11 NOTE — ANESTHESIA PREPROCEDURE EVALUATION
10/11/2019  Delmer Castellano is a 34 y.o., male.  Pre-operative evaluation for Procedure(s) (LRB):  REPAIR, TENDON, FLEXOR - right index and long finger exploration with repair of any damaged structures, need Arthrex 4-0 FiberLoops x entire box, axogen 1-2mm frozen nerve grafts, tarah hand, evicel fibrin glue (Right)    Delmer Castellano is a 34 y.o. male       Active problems:  Patient Active Problem List    Diagnosis Date Noted    Flexor tendon laceration of finger with open wound 10/08/2019         Prev airway:       Review of patient's allergies indicates:  No Known Allergies     No current facility-administered medications on file prior to encounter.      Current Outpatient Medications on File Prior to Encounter   Medication Sig Dispense Refill    sulfamethoxazole-trimethoprim 800-160mg (BACTRIM DS) 800-160 mg Tab Take 1 tablet by mouth 2 (two) times daily. for 10 days 20 tablet 0       History reviewed. No pertinent surgical history.    Social History     Socioeconomic History    Marital status: Single     Spouse name: Not on file    Number of children: Not on file    Years of education: Not on file    Highest education level: Not on file   Occupational History    Not on file   Social Needs    Financial resource strain: Not on file    Food insecurity:     Worry: Not on file     Inability: Not on file    Transportation needs:     Medical: Not on file     Non-medical: Not on file   Tobacco Use    Smoking status: Never Smoker    Smokeless tobacco: Never Used   Substance and Sexual Activity    Alcohol use: Not Currently     Frequency: Never    Drug use: Not Currently    Sexual activity: Not on file   Lifestyle    Physical activity:     Days per week: Not on file     Minutes per session: Not on file    Stress: Not on file   Relationships    Social connections:     Talks on phone: Not on file      Gets together: Not on file     Attends Anabaptism service: Not on file     Active member of club or organization: Not on file     Attends meetings of clubs or organizations: Not on file     Relationship status: Not on file   Other Topics Concern    Not on file   Social History Narrative    Not on file         Vital Signs Range (Last 24H):  Wt Readings from Last 3 Encounters:   10/11/19 66.2 kg (146 lb)   10/10/19 67.4 kg (148 lb 7.7 oz)     Temp Readings from Last 3 Encounters:   10/11/19 36.6 °C (97.8 °F) (Oral)   10/08/19 36.8 °C (98.2 °F) (Oral)     BP Readings from Last 3 Encounters:   10/11/19 135/60   10/10/19 108/62   10/08/19 133/63     Pulse Readings from Last 3 Encounters:   10/11/19 60   10/10/19 67   10/08/19 (!) 58         CBC: No results found for: WBC, HGB, HCT, MCV, PLT    CMP: CMP  No results found for: NA, K, CL, CO2, GLU, BUN, CREATININE, CALCIUM, PROT, ALBUMIN, BILITOT, ALKPHOS, AST, ALT, ANIONGAP, ESTGFRAFRICA, EGFRNONAA    INR  No results found for: INR, PROTIME        Diagnostic Studies:      EKD Echo:            Anesthesia Evaluation    I have reviewed the Patient Summary Reports.    I have reviewed the Nursing Notes.   I have reviewed the Medications.     Review of Systems  Anesthesia Hx:  No problems with previous Anesthesia  Denies Family Hx of Anesthesia complications.   Denies Personal Hx of Anesthesia complications.   Social:  Non-Smoker, No Alcohol Use    Cardiovascular:  Cardiovascular Normal Exercise tolerance: good     Pulmonary:  Pulmonary Normal    Endocrine:  Endocrine Normal        Physical Exam  General:  Well nourished    Airway/Jaw/Neck:  Airway Findings: Mouth Opening: Normal Tongue: Normal  General Airway Assessment: Adult  Mallampati: I  Jaw/Neck Findings:  Neck ROM: Normal ROM      Dental:  Dental Findings: In tact   Chest/Lungs:  Chest/Lungs Findings: Clear to auscultation     Heart/Vascular:  Heart Findings: Rate: Normal  Rhythm: Regular Rhythm  Sounds:  Normal        Mental Status:  Mental Status Findings:  Cooperative         Anesthesia Plan  Type of Anesthesia, risks & benefits discussed:  Anesthesia Type:  general, regional  Patient's Preference:   Intra-op Monitoring Plan:   Intra-op Monitoring Plan Comments:   Post Op Pain Control Plan:   Post Op Pain Control Plan Comments:   Induction:   IV  Beta Blocker:  Patient is not currently on a Beta-Blocker (No further documentation required).       Informed Consent: Patient understands risks and agrees with Anesthesia plan.  Questions answered. Anesthesia consent signed with patient.  ASA Score: 1     Day of Surgery Review of History & Physical:    H&P update referred to the surgeon.         Ready For Surgery From Anesthesia Perspective.

## 2019-10-14 VITALS
TEMPERATURE: 98 F | DIASTOLIC BLOOD PRESSURE: 65 MMHG | RESPIRATION RATE: 14 BRPM | SYSTOLIC BLOOD PRESSURE: 148 MMHG | OXYGEN SATURATION: 100 % | HEART RATE: 73 BPM | BODY MASS INDEX: 20.44 KG/M2 | WEIGHT: 146 LBS | HEIGHT: 71 IN

## 2019-10-14 NOTE — ANESTHESIA POSTPROCEDURE EVALUATION
Anesthesia Post Evaluation    Patient: Demler Castellano    Procedure(s) Performed: Procedure(s) (LRB):  REPAIR, TENDON, FLEXOR - right index and long finger exploration with repair of any damaged structures, need Arthrex 4-0 FiberLoops x entire box, axogen 1-2mm frozen nerve grafts, tarah hand, evicel fibrin glue (Right)    Final Anesthesia Type: general  Patient location during evaluation: PACU  Patient participation: Yes- Able to Participate  Level of consciousness: awake and alert  Post-procedure vital signs: reviewed and stable  Pain management: adequate  Airway patency: patent  PONV status at discharge: No PONV  Anesthetic complications: no      Cardiovascular status: blood pressure returned to baseline  Respiratory status: unassisted  Hydration status: euvolemic  Follow-up not needed.          Vitals Value Taken Time   /65 10/11/2019  3:18 PM   Temp 36.5 °C (97.7 °F) 10/11/2019  3:00 PM   Pulse 56 10/11/2019  3:25 PM   Resp 14 10/11/2019  3:15 PM   SpO2 100 % 10/11/2019  3:25 PM   Vitals shown include unvalidated device data.      Event Time     Out of Recovery 14:45:00          Pain/Dajuan Score: No data recorded

## 2019-10-15 ENCOUNTER — DOCUMENTATION ONLY (OUTPATIENT)
Dept: REHABILITATION | Facility: HOSPITAL | Age: 34
End: 2019-10-15

## 2019-10-15 ENCOUNTER — TELEPHONE (OUTPATIENT)
Dept: REHABILITATION | Facility: HOSPITAL | Age: 34
End: 2019-10-15

## 2019-10-15 NOTE — PROGRESS NOTES
Patient did not show up nor cancel his evaluation for s/pPROCEDURE(S) PERFORMED:    1. Irrigation and non excisional debridement right index finger and right long finger sharp lacerations  2. Flexor digitorum profundus tendon repair in the right long finger in zone 1  3. Flexor digitorum profundus tendon repair in the right index finger in zone 2  4. Flexor digitorum superficialis tendon repair in the right index finger in zone 2  5. Radial digital nerve repair right index finger with nerve allograft  6. Ulnar digital nerve repair right index finger via primary repair  Radial digital nerve repair right long finger with nerve allograft      Dr. Jluis Ugalde was contacted

## 2019-10-18 ENCOUNTER — TELEPHONE (OUTPATIENT)
Dept: REHABILITATION | Facility: HOSPITAL | Age: 34
End: 2019-10-18

## 2019-10-29 ENCOUNTER — OFFICE VISIT (OUTPATIENT)
Dept: ORTHOPEDICS | Facility: CLINIC | Age: 34
End: 2019-10-29
Payer: MEDICAID

## 2019-10-29 VITALS — BODY MASS INDEX: 20.43 KG/M2 | HEIGHT: 71 IN | WEIGHT: 145.94 LBS

## 2019-10-29 DIAGNOSIS — S61.209A FLEXOR TENDON LACERATION OF FINGER WITH OPEN WOUND, INITIAL ENCOUNTER: Primary | ICD-10-CM

## 2019-10-29 DIAGNOSIS — S56.129A FLEXOR TENDON LACERATION OF FINGER WITH OPEN WOUND, INITIAL ENCOUNTER: Primary | ICD-10-CM

## 2019-10-29 PROCEDURE — 99999 PR PBB SHADOW E&M-EST. PATIENT-LVL III: CPT | Mod: PBBFAC,,, | Performed by: ORTHOPAEDIC SURGERY

## 2019-10-29 PROCEDURE — 99024 POSTOP FOLLOW-UP VISIT: CPT | Mod: ,,, | Performed by: ORTHOPAEDIC SURGERY

## 2019-10-29 PROCEDURE — 99213 OFFICE O/P EST LOW 20 MIN: CPT | Mod: PBBFAC | Performed by: ORTHOPAEDIC SURGERY

## 2019-10-29 PROCEDURE — 99999 PR PBB SHADOW E&M-EST. PATIENT-LVL III: ICD-10-PCS | Mod: PBBFAC,,, | Performed by: ORTHOPAEDIC SURGERY

## 2019-10-29 PROCEDURE — 99024 PR POST-OP FOLLOW-UP VISIT: ICD-10-PCS | Mod: ,,, | Performed by: ORTHOPAEDIC SURGERY

## 2019-10-29 NOTE — PROGRESS NOTES
Delmer Castellano presents for post-operative evaluation.  The patient is now 2 weeks s/p:    PROCEDURE(S) PERFORMED:    1. Irrigation and non excisional debridement right index finger and right long finger sharp lacerations  2. Flexor digitorum profundus tendon repair in the right long finger in zone 1  3. Flexor digitorum profundus tendon repair in the right index finger in zone 2  4. Flexor digitorum superficialis tendon repair in the right index finger in zone 2  5. Radial digital nerve repair right index finger with nerve allograft  6. Ulnar digital nerve repair right index finger via primary repair  Radial digital nerve repair right long finger with nerve allograft    Unfortunately the patient presents for his initial postoperative evaluation today with essentially no postoperative dressing on except for some Coban around the fingers.  He has no splint on.  He reports that his postop splint fell off Friday night which would have been October 25, 2019.  He notes similar numbness in the fingers as before surgery and has no new complaints today.    PE:    AA&O x 4.  NAD  HEENT:  NCAT, sclera nonicteric  Lungs:  Respirations are equal and unlabored.  CV:  2+ bilateral upper and lower extremity pulses.  MSK: The wound is healing well with no signs of erythema or warmth.  There is no drainage.  No clinical signs or symptoms of infection are present.  All sutures were removed today. The patient appears to have some minimal active flexion of the DIP joints of both the right index and long fingers on testing today although it is limited to little more than a flicker.  Decreased light touch sensation to the right index and long finger tips.    A/P: Status post above  1) Continue with non weight bearing  2) F/U for weeks  3) Call with any questions/concerns in the interim  4) patient counseled extensively today in regards to the importance of postoperative compliance.  He has been contacted several times by occupational  therapy as we made several attempts to get him into this early in the postoperative period and all phone calls and attempts unfortunately went unanswered and unreturned.  Furthermore, the patient presented today 45 min late for his appointment and without his postoperative splint and dressing on.  I have informed him that this level of noncompliance is not acceptable and May significantly and detrimentally affect his outcome.  It is imperative that he comply with treatment recommendations and rehabilitation protocols going forward.  He appears to have some minimal flexion of the DIP joints to his operative fingers intact today. We will proceed with further attempts to get the patient into occupational therapy and I have educated him on the importance of this in his recovery.  I will place him back into a dorsal blocking splint to the right upper extremity today and contact his anticipated occupational therapist again. He understands that his noncompliance thus far has risked rupture of his previous repairs but given his minimal intact active DIP flexion we will proceed with occupational therapy for now to maximize his outcome.     Please be aware that this note has been generated with the assistance of [employer] voice-to-text.  Please excuse any spelling or grammatical errors.

## 2019-11-07 ENCOUNTER — TELEPHONE (OUTPATIENT)
Dept: REHABILITATION | Facility: HOSPITAL | Age: 34
End: 2019-11-07

## 2019-11-26 ENCOUNTER — TELEPHONE (OUTPATIENT)
Dept: ORTHOPEDICS | Facility: CLINIC | Age: 34
End: 2019-11-26

## 2019-11-26 NOTE — TELEPHONE ENCOUNTER
Called patient in regard to phone message about missed appt. [place] him [place] [facility] [place] He verbalized understanding of information that was given.

## 2019-11-26 NOTE — TELEPHONE ENCOUNTER
----- Message from Chris Shah sent at 11/26/2019 11:00 AM CST -----  Contact: pt  Please call pt at [phone]    Patient would like to reschedule his post op appt missed this morning    Thank you

## 2019-12-03 ENCOUNTER — OFFICE VISIT (OUTPATIENT)
Dept: ORTHOPEDICS | Facility: CLINIC | Age: 34
End: 2019-12-03
Payer: MEDICAID

## 2019-12-03 DIAGNOSIS — S61.209A FLEXOR TENDON LACERATION OF FINGER WITH OPEN WOUND, INITIAL ENCOUNTER: Primary | ICD-10-CM

## 2019-12-03 DIAGNOSIS — S56.129A FLEXOR TENDON LACERATION OF FINGER WITH OPEN WOUND, INITIAL ENCOUNTER: Primary | ICD-10-CM

## 2019-12-03 PROCEDURE — 99024 POSTOP FOLLOW-UP VISIT: CPT | Mod: ,,, | Performed by: ORTHOPAEDIC SURGERY

## 2019-12-03 PROCEDURE — 99024 PR POST-OP FOLLOW-UP VISIT: ICD-10-PCS | Mod: ,,, | Performed by: ORTHOPAEDIC SURGERY

## 2019-12-03 NOTE — PROGRESS NOTES
Delmer Castellano presents for post-operative evaluation.  The patient is now 7 weeks s/p: (DOS: 10/11/19)    PROCEDURE(S) PERFORMED:    1. Irrigation and non excisional debridement right index finger and right long finger sharp lacerations  2. Flexor digitorum profundus tendon repair in the right long finger in zone 1  3. Flexor digitorum profundus tendon repair in the right index finger in zone 2  4. Flexor digitorum superficialis tendon repair in the right index finger in zone 2  5. Radial digital nerve repair right index finger with nerve allograft  6. Ulnar digital nerve repair right index finger via primary repair  Radial digital nerve repair right long finger with nerve allograft    Pt presents for followup.  After extensive compliance discussions at last visit, unfortunately the patient's noncompliance has continued.  He was placed in a dorsal blocking splint which is not on today and furthermore he has been unable to attend any occupational therapy since his surgery. He notes significant stiffness in the index greater than the long fingers.  He reports that his light touch sensation is improving but still present in the tip of the index finger.  No new complaints.      PE:    AA&O x 4.  NAD  HEENT:  NCAT, sclera nonicteric  Lungs:  Respirations are equal and unlabored.  CV:  2+ bilateral upper and lower extremity pulses.  MSK: Incisions-well healed scars with  No clinical signs or symptoms of infection are present.  The patient has intact light touch sensation to the long finger with slightly decreased light touch sensation to the tip of the index finger although it is improved from preoperative levels per his report any does have an advancing Tinel's.  R LF:   (MCP/PIP/DIP)      AROM: 90/40/30      PROM: 90/45/45    R IF: (MCP/PIP/DIP)      AROM: 90/5/5      PROM: 90/40/20        A/P: Status post above  1) extensive discussion held with the patient. Unfortunately there has been significant noncompliance on  his part including failure to attend repeated attempts and referrals for occupational therapy as well as repeated postoperative splint and dressing removals on his part.  He has now developed significant stiffness in the index greater than the long fingers.  Since he is approximately 7 weeks out, I discussed placing another referral for occupational therapy in an attempt to maximize his surgical outcome although he reports that he will be unable to attend this going forward.  Thus, I recommend activities and weight-bearing as tolerated without any restrictions of the right hand.  I showed him exercises to perform both passive and active range of motion today and advised him to perform these continuously on his own.  He may return to see me on an as-needed basis.       Please be aware that this note has been generated with the assistance of [employer] voice-to-text.  Please excuse any spelling or grammatical errors.

## (undated) DEVICE — SUT 4-0 ETHILON 18 PS-2

## (undated) DEVICE — SLING ARM MEDIUM FOAM STRAP

## (undated) DEVICE — SEE MEDLINE ITEM 152622

## (undated) DEVICE — CORD BIPOLAR 12 FOOT

## (undated) DEVICE — STOCKINET 4INX48

## (undated) DEVICE — DRAPE STERI-DRAPE 1000 17X11IN

## (undated) DEVICE — BANDAGE ELASTIC ACE 2IN 10/CA

## (undated) DEVICE — DRESSING XEROFORM FOIL PK 1X8

## (undated) DEVICE — DRAPE PLASTIC U 60X72

## (undated) DEVICE — SUT FIBERLOOP 4-0 T-13 12IN

## (undated) DEVICE — Device

## (undated) DEVICE — DRESSING XEROFORM 1X8IN

## (undated) DEVICE — TOURNIQUET SB QC DP 18X4IN

## (undated) DEVICE — SEE MEDLINE ITEM 152515

## (undated) DEVICE — NDL 22GA X1 1/2 REG BEVEL

## (undated) DEVICE — KIT FIBRIN SEALANT EVICEL 5 ML

## (undated) DEVICE — GAUZE SPONGE 4X4 12PLY

## (undated) DEVICE — SUT ETHILON 4-0 PS2 18 BLK

## (undated) DEVICE — SPLINT PLASTER EXT FAST 4X15

## (undated) DEVICE — KIT SEAL HEMSTAT EVICEL 35CM

## (undated) DEVICE — ELECTRODE REM PLYHSV RETURN 9

## (undated) DEVICE — PAD CAST SPECIALIST STRL 4

## (undated) DEVICE — SEE MEDLINE ITEM 157117